# Patient Record
Sex: FEMALE | Race: OTHER | Employment: FULL TIME | URBAN - METROPOLITAN AREA
[De-identification: names, ages, dates, MRNs, and addresses within clinical notes are randomized per-mention and may not be internally consistent; named-entity substitution may affect disease eponyms.]

---

## 2019-02-19 ENCOUNTER — OFFICE VISIT (OUTPATIENT)
Dept: FAMILY MEDICINE CLINIC | Facility: CLINIC | Age: 49
End: 2019-02-19
Payer: COMMERCIAL

## 2019-02-19 VITALS
WEIGHT: 144 LBS | DIASTOLIC BLOOD PRESSURE: 68 MMHG | RESPIRATION RATE: 17 BRPM | HEART RATE: 69 BPM | HEIGHT: 64.5 IN | BODY MASS INDEX: 24.29 KG/M2 | SYSTOLIC BLOOD PRESSURE: 116 MMHG

## 2019-02-19 DIAGNOSIS — Z00.00 ROUTINE MEDICAL EXAM: Primary | ICD-10-CM

## 2019-02-19 DIAGNOSIS — H20.9 UVEITIS: ICD-10-CM

## 2019-02-19 DIAGNOSIS — O24.419 GESTATIONAL DIABETES MELLITUS (GDM), ANTEPARTUM, GESTATIONAL DIABETES METHOD OF CONTROL UNSPECIFIED: ICD-10-CM

## 2019-02-19 PROCEDURE — 90471 IMMUNIZATION ADMIN: CPT | Performed by: FAMILY MEDICINE

## 2019-02-19 PROCEDURE — 90715 TDAP VACCINE 7 YRS/> IM: CPT | Performed by: FAMILY MEDICINE

## 2019-02-19 PROCEDURE — 99386 PREV VISIT NEW AGE 40-64: CPT | Performed by: FAMILY MEDICINE

## 2019-02-19 NOTE — H&P
HPI:   Patient presents with:  Physical      Shyrl Beka is a 50year old female who presents for a complete physical exam without pap/gyne exam.     Patient has new complaints of:  · History of intermittent mild uveitis treated with topical steroid, pa Sexual Activity      Alcohol use: Yes        Frequency: 2-4 times a month        Drinks per session: 1 or 2        Binge frequency: Never      Drug use: No    Other Topics      Concerns:          REVIEW OF SYSTEMS:   Pertinent positives and negatives noted and LE, no tremor B UE, DTR's 2/4 B LE, gait WNL     ASSESSMENT AND PLAN:   1. Jacky Avitia is a 50year old female who presents for a complete physical exam.  She is in good health.   Her weight is stable, Estimated body mass index is 24.34 kg/m² as calcul pending results  - HEMOGLOBIN A1C; Future  - COMP METABOLIC PANEL (14); Future      The patient indicates understanding of the above recommendations and agrees to the above plan.   Follow up: as above    Orders Placed This Encounter      HgbA1c (Glycohemogl

## 2019-02-20 ENCOUNTER — MED REC SCAN ONLY (OUTPATIENT)
Dept: FAMILY MEDICINE CLINIC | Facility: CLINIC | Age: 49
End: 2019-02-20

## 2019-02-28 ENCOUNTER — LAB ENCOUNTER (OUTPATIENT)
Dept: LAB | Facility: REFERENCE LAB | Age: 49
End: 2019-02-28
Attending: FAMILY MEDICINE
Payer: COMMERCIAL

## 2019-02-28 DIAGNOSIS — O24.419 GESTATIONAL DIABETES MELLITUS (GDM), ANTEPARTUM, GESTATIONAL DIABETES METHOD OF CONTROL UNSPECIFIED: ICD-10-CM

## 2019-02-28 DIAGNOSIS — H20.9 UVEITIS: ICD-10-CM

## 2019-02-28 DIAGNOSIS — Z00.00 ROUTINE MEDICAL EXAM: ICD-10-CM

## 2019-02-28 LAB
ALBUMIN SERPL-MCNC: 3.5 G/DL (ref 3.4–5)
ALBUMIN/GLOB SERPL: 0.8 {RATIO} (ref 1–2)
ALP LIVER SERPL-CCNC: 49 U/L (ref 39–100)
ALT SERPL-CCNC: 25 U/L (ref 13–56)
ANION GAP SERPL CALC-SCNC: 5 MMOL/L (ref 0–18)
AST SERPL-CCNC: 22 U/L (ref 15–37)
BASOPHILS # BLD AUTO: 0.05 X10(3) UL (ref 0–0.2)
BASOPHILS NFR BLD AUTO: 0.8 %
BILIRUB SERPL-MCNC: 0.5 MG/DL (ref 0.1–2)
BUN BLD-MCNC: 12 MG/DL (ref 7–18)
BUN/CREAT SERPL: 16.2 (ref 10–20)
CALCIUM BLD-MCNC: 9 MG/DL (ref 8.5–10.1)
CHLORIDE SERPL-SCNC: 106 MMOL/L (ref 98–107)
CHOLEST SMN-MCNC: 125 MG/DL (ref ?–200)
CO2 SERPL-SCNC: 28 MMOL/L (ref 21–32)
CREAT BLD-MCNC: 0.74 MG/DL (ref 0.55–1.02)
DEPRECATED RDW RBC AUTO: 47.2 FL (ref 35.1–46.3)
EOSINOPHIL # BLD AUTO: 0.19 X10(3) UL (ref 0–0.7)
EOSINOPHIL NFR BLD AUTO: 3.1 %
ERYTHROCYTE [DISTWIDTH] IN BLOOD BY AUTOMATED COUNT: 14.9 % (ref 11–15)
EST. AVERAGE GLUCOSE BLD GHB EST-MCNC: 123 MG/DL (ref 68–126)
GLOBULIN PLAS-MCNC: 4.3 G/DL (ref 2.8–4.4)
GLUCOSE BLD-MCNC: 88 MG/DL (ref 70–99)
HBA1C MFR BLD HPLC: 5.9 % (ref ?–5.7)
HCT VFR BLD AUTO: 34.8 % (ref 35–48)
HDLC SERPL-MCNC: 56 MG/DL (ref 40–59)
HGB BLD-MCNC: 11.3 G/DL (ref 12–16)
IMM GRANULOCYTES # BLD AUTO: 0.02 X10(3) UL (ref 0–1)
IMM GRANULOCYTES NFR BLD: 0.3 %
LDLC SERPL CALC-MCNC: 57 MG/DL (ref ?–100)
LYMPHOCYTES # BLD AUTO: 2.11 X10(3) UL (ref 1–4)
LYMPHOCYTES NFR BLD AUTO: 34.6 %
M PROTEIN MFR SERPL ELPH: 7.8 G/DL (ref 6.4–8.2)
MCH RBC QN AUTO: 28 PG (ref 26–34)
MCHC RBC AUTO-ENTMCNC: 32.5 G/DL (ref 31–37)
MCV RBC AUTO: 86.4 FL (ref 80–100)
MONOCYTES # BLD AUTO: 0.3 X10(3) UL (ref 0.1–1)
MONOCYTES NFR BLD AUTO: 4.9 %
NEUTROPHILS # BLD AUTO: 3.43 X10 (3) UL (ref 1.5–7.7)
NEUTROPHILS # BLD AUTO: 3.43 X10(3) UL (ref 1.5–7.7)
NEUTROPHILS NFR BLD AUTO: 56.3 %
NONHDLC SERPL-MCNC: 69 MG/DL (ref ?–130)
OSMOLALITY SERPL CALC.SUM OF ELEC: 287 MOSM/KG (ref 275–295)
PLATELET # BLD AUTO: 297 10(3)UL (ref 150–450)
POTASSIUM SERPL-SCNC: 4.4 MMOL/L (ref 3.5–5.1)
RBC # BLD AUTO: 4.03 X10(6)UL (ref 3.8–5.3)
SODIUM SERPL-SCNC: 139 MMOL/L (ref 136–145)
TRIGL SERPL-MCNC: 60 MG/DL (ref 30–149)
TSI SER-ACNC: 3.21 MIU/ML (ref 0.36–3.74)
VLDLC SERPL CALC-MCNC: 12 MG/DL (ref 0–30)
WBC # BLD AUTO: 6.1 X10(3) UL (ref 4–11)

## 2019-02-28 PROCEDURE — 86735 MUMPS ANTIBODY: CPT | Performed by: FAMILY MEDICINE

## 2019-02-28 PROCEDURE — 86038 ANTINUCLEAR ANTIBODIES: CPT | Performed by: FAMILY MEDICINE

## 2019-02-28 PROCEDURE — 83036 HEMOGLOBIN GLYCOSYLATED A1C: CPT | Performed by: FAMILY MEDICINE

## 2019-02-28 PROCEDURE — 86762 RUBELLA ANTIBODY: CPT | Performed by: FAMILY MEDICINE

## 2019-02-28 PROCEDURE — 36415 COLL VENOUS BLD VENIPUNCTURE: CPT | Performed by: FAMILY MEDICINE

## 2019-02-28 PROCEDURE — 86658 ENTEROVIRUS ANTIBODY: CPT | Performed by: FAMILY MEDICINE

## 2019-02-28 PROCEDURE — 86765 RUBEOLA ANTIBODY: CPT | Performed by: FAMILY MEDICINE

## 2019-02-28 PROCEDURE — 80061 LIPID PANEL: CPT | Performed by: FAMILY MEDICINE

## 2019-02-28 PROCEDURE — 80050 GENERAL HEALTH PANEL: CPT | Performed by: FAMILY MEDICINE

## 2019-03-01 LAB
MEV IGG SER-ACNC: 10.8 AU/ML (ref 30–?)
MUV IGG SER IA-ACNC: 85.4 AU/ML (ref 11–?)
RUBV IGG SER QL: POSITIVE
RUBV IGG SER-ACNC: 46.2 IU/ML (ref 10–?)

## 2019-03-04 LAB — NUCLEAR IGG TITR SER IF: NEGATIVE {TITER}

## 2019-03-10 DIAGNOSIS — R73.09 ELEVATED HEMOGLOBIN A1C: Primary | ICD-10-CM

## 2019-03-10 DIAGNOSIS — D64.89 ANEMIA DUE TO OTHER CAUSE, NOT CLASSIFIED: ICD-10-CM

## 2019-04-10 ENCOUNTER — NURSE ONLY (OUTPATIENT)
Dept: FAMILY MEDICINE CLINIC | Facility: CLINIC | Age: 49
End: 2019-04-10
Payer: COMMERCIAL

## 2019-04-10 DIAGNOSIS — Z23 NEED FOR MMR VACCINE: Primary | ICD-10-CM

## 2019-04-10 PROCEDURE — 90707 MMR VACCINE SC: CPT | Performed by: FAMILY MEDICINE

## 2019-04-10 PROCEDURE — 90471 IMMUNIZATION ADMIN: CPT | Performed by: FAMILY MEDICINE

## 2019-05-21 PROBLEM — G57.63 MORTON METATARSALGIA, BILATERAL: Status: ACTIVE | Noted: 2019-05-21

## 2021-03-24 ENCOUNTER — TELEPHONE (OUTPATIENT)
Dept: FAMILY MEDICINE CLINIC | Facility: CLINIC | Age: 51
End: 2021-03-24

## 2021-07-06 ENCOUNTER — TELEPHONE (OUTPATIENT)
Dept: FAMILY MEDICINE CLINIC | Facility: CLINIC | Age: 51
End: 2021-07-06

## 2021-07-16 ENCOUNTER — TELEPHONE (OUTPATIENT)
Dept: FAMILY MEDICINE CLINIC | Facility: CLINIC | Age: 51
End: 2021-07-16

## 2021-07-16 NOTE — TELEPHONE ENCOUNTER
Received vm from pt, said employer requires titers for varicella, MMR, hep B, TB, and tdap.  Said she is visiting a customer (sounds like client is a hospital) and needs titers before able to go into hospital. Pt is requesting that orders be placed today so

## 2021-07-28 ENCOUNTER — OFFICE VISIT (OUTPATIENT)
Dept: FAMILY MEDICINE CLINIC | Facility: CLINIC | Age: 51
End: 2021-07-28
Payer: COMMERCIAL

## 2021-07-28 VITALS
HEART RATE: 73 BPM | BODY MASS INDEX: 24.93 KG/M2 | SYSTOLIC BLOOD PRESSURE: 108 MMHG | WEIGHT: 147.81 LBS | OXYGEN SATURATION: 97 % | HEIGHT: 64.5 IN | DIASTOLIC BLOOD PRESSURE: 52 MMHG

## 2021-07-28 DIAGNOSIS — Z01.84 IMMUNITY STATUS TESTING: ICD-10-CM

## 2021-07-28 DIAGNOSIS — Z00.00 ADULT GENERAL MEDICAL EXAMINATION: Primary | ICD-10-CM

## 2021-07-28 DIAGNOSIS — Z12.11 SCREENING FOR MALIGNANT NEOPLASM OF COLON: ICD-10-CM

## 2021-07-28 PROBLEM — G57.63 MORTON METATARSALGIA, BILATERAL: Status: RESOLVED | Noted: 2019-05-21 | Resolved: 2021-07-28

## 2021-07-28 PROCEDURE — 3008F BODY MASS INDEX DOCD: CPT | Performed by: NURSE PRACTITIONER

## 2021-07-28 PROCEDURE — 3074F SYST BP LT 130 MM HG: CPT | Performed by: NURSE PRACTITIONER

## 2021-07-28 PROCEDURE — 99396 PREV VISIT EST AGE 40-64: CPT | Performed by: NURSE PRACTITIONER

## 2021-07-28 PROCEDURE — 3078F DIAST BP <80 MM HG: CPT | Performed by: NURSE PRACTITIONER

## 2021-07-28 NOTE — PROGRESS NOTES
Chief Complaint:   Patient presents with:  Physical: w BE   Lab: titers for varicella,mmr, hep B , TB      HPI:   This is a 48year old female coming in for complete physical exam without pap/gyne. Patient feels well overall.  Walks 30 minutes/day for exerc Mumps IgG Immuity 85.4 >=11.0 AU/mL   RUBELLA, IGG   Result Value Ref Range    Rubella IgG Quantitative [id number] >=10 IU/mL    Rubella IgG Positive Positive   POLIOVIRUS (TYPES 1, 3) ANTIBODIES   Result Value Ref Range    Polio Virus Antibody Type 1 1:80 (SEE COMMENTS)    Comment:Pt is unsure of reaction. Current Meds:  No current outpatient medications on file. Counseling given: Not Answered       REVIEW OF SYSTEMS:   CONSTITUTIONAL:  Denies unusual weight gain/loss, fever, chills, or fatigue.   WILLIE lymphadenopathy BL, no nipple discharge or retraction, no skin changes BL. ABDOMEN:  Soft, nondistended, nontender, bowel sounds normal in all 4 quadrants, no masses, no hepatosplenomegaly. BACK: No tenderness to palpation, FROM.   EXTREMITIES:  No edema,

## 2021-07-28 NOTE — PATIENT INSTRUCTIONS

## 2021-07-31 LAB
ABSOLUTE BASOPHILS: 58 CELLS/UL (ref 0–200)
ABSOLUTE EOSINOPHILS: 122 CELLS/UL (ref 15–500)
ABSOLUTE LYMPHOCYTES: 2786 CELLS/UL (ref 850–3900)
ABSOLUTE MONOCYTES: 338 CELLS/UL (ref 200–950)
ABSOLUTE NEUTROPHILS: 3895 CELLS/UL (ref 1500–7800)
ALBUMIN/GLOBULIN RATIO: 1.3 (CALC) (ref 1–2.5)
ALBUMIN: 4 G/DL (ref 3.6–5.1)
ALKALINE PHOSPHATASE: 44 U/L (ref 37–153)
ALT: 14 U/L (ref 6–29)
AST: 17 U/L (ref 10–35)
BASOPHILS: 0.8 %
BILIRUBIN, TOTAL: 0.5 MG/DL (ref 0.2–1.2)
BUN: 15 MG/DL (ref 7–25)
CALCIUM: 8.9 MG/DL (ref 8.6–10.4)
CARBON DIOXIDE: 28 MMOL/L (ref 20–32)
CHLORIDE: 105 MMOL/L (ref 98–110)
CHOL/HDLC RATIO: 2.2 (CALC)
CHOLESTEROL, TOTAL: 127 MG/DL
CREATININE: 0.71 MG/DL (ref 0.5–1.05)
EGFR IF AFRICN AM: 115 ML/MIN/1.73M2
EGFR IF NONAFRICN AM: 99 ML/MIN/1.73M2
EOSINOPHILS: 1.7 %
GLOBULIN: 3.1 G/DL (CALC) (ref 1.9–3.7)
GLUCOSE: 97 MG/DL (ref 65–99)
HDL CHOLESTEROL: 57 MG/DL
HEMATOCRIT: 36.2 % (ref 35–45)
HEMOGLOBIN: 11.6 G/DL (ref 11.7–15.5)
LDL-CHOLESTEROL: 56 MG/DL (CALC)
LYMPHOCYTES: 38.7 %
MCH: 28.2 PG (ref 27–33)
MCHC: 32 G/DL (ref 32–36)
MCV: 88.1 FL (ref 80–100)
MEASLES ANTIBODY (IGG): <13.5 AU/ML
MITOGEN-NIL: 7.43 IU/ML
MONOCYTES: 4.7 %
MPV: 10.5 FL (ref 7.5–12.5)
MUMPS VIRUS$ANTIBODY (IGG): 104 AU/ML
NEUTROPHILS: 54.1 %
NIL: 0.04 IU/ML
NON-HDL CHOLESTEROL: 70 MG/DL (CALC)
PLATELET COUNT: 268 THOUSAND/UL (ref 140–400)
POTASSIUM: 5.1 MMOL/L (ref 3.5–5.3)
PROTEIN, TOTAL: 7.1 G/DL (ref 6.1–8.1)
QUANTIFERON(R)-TB GOLD PLUS, 1 TUBE: NEGATIVE
RDW: 13.2 % (ref 11–15)
RED BLOOD CELL COUNT: 4.11 MILLION/UL (ref 3.8–5.1)
RUBELLA ANTIBODY (IGG): 4.2 INDEX
SODIUM: 138 MMOL/L (ref 135–146)
TB1-NIL: 0 IU/ML
TB2-NIL: 0.01 IU/ML
TRIGLYCERIDES: 60 MG/DL
TSH: 4.15 MIU/L
VARICELLA ZOSTER VIRUS$AB (IGG): 895.6 INDEX
WHITE BLOOD CELL COUNT: 7.2 THOUSAND/UL (ref 3.8–10.8)

## 2021-09-01 ENCOUNTER — NURSE ONLY (OUTPATIENT)
Dept: FAMILY MEDICINE CLINIC | Facility: CLINIC | Age: 51
End: 2021-09-01
Payer: COMMERCIAL

## 2021-09-01 PROCEDURE — 90472 IMMUNIZATION ADMIN EACH ADD: CPT | Performed by: NURSE PRACTITIONER

## 2021-09-01 PROCEDURE — 90471 IMMUNIZATION ADMIN: CPT | Performed by: NURSE PRACTITIONER

## 2021-09-01 PROCEDURE — 90707 MMR VACCINE SC: CPT | Performed by: NURSE PRACTITIONER

## 2021-09-01 PROCEDURE — 90746 HEPB VACCINE 3 DOSE ADULT IM: CPT | Performed by: NURSE PRACTITIONER

## 2021-09-15 ENCOUNTER — NURSE ONLY (OUTPATIENT)
Dept: FAMILY MEDICINE CLINIC | Facility: CLINIC | Age: 51
End: 2021-09-15
Payer: COMMERCIAL

## 2021-09-15 DIAGNOSIS — Z23 ENCOUNTER FOR IMMUNIZATION: Primary | ICD-10-CM

## 2021-09-15 PROCEDURE — 90471 IMMUNIZATION ADMIN: CPT | Performed by: NURSE PRACTITIONER

## 2021-09-15 PROCEDURE — 90686 IIV4 VACC NO PRSV 0.5 ML IM: CPT | Performed by: NURSE PRACTITIONER

## 2021-10-06 ENCOUNTER — NURSE ONLY (OUTPATIENT)
Dept: FAMILY MEDICINE CLINIC | Facility: CLINIC | Age: 51
End: 2021-10-06
Payer: COMMERCIAL

## 2021-10-06 PROCEDURE — 90746 HEPB VACCINE 3 DOSE ADULT IM: CPT | Performed by: FAMILY MEDICINE

## 2021-10-06 PROCEDURE — 90471 IMMUNIZATION ADMIN: CPT | Performed by: FAMILY MEDICINE

## 2021-10-21 ENCOUNTER — TELEPHONE (OUTPATIENT)
Dept: FAMILY MEDICINE CLINIC | Facility: CLINIC | Age: 51
End: 2021-10-21

## 2021-11-03 NOTE — H&P
[facility] - Gastroenterology                                                                                                               Reason for consult: crc screening    Requesting physician or provider: [facility] Never used    Alcohol use: Yes    Drug use: No       Medications (Active prior to today's visit):  No current outpatient medications on file.        Allergies:    Penicillins             HIVES  Iodine (Topical)        OTHER (SEE COMMENTS)    Comment:Pt is u normal.  Last cbc 2021-hgb stable from comparison. Chronic normocytic anemia. H/o endometriosis. She denies a FH GI malignancy. He has not had a colonoscopy and so is now due for CRC screening. Plan for procedure at this time.    -miralax  1.  Schedule c exist.

## 2021-11-08 ENCOUNTER — TELEPHONE (OUTPATIENT)
Dept: GASTROENTEROLOGY | Facility: CLINIC | Age: 51
End: 2021-11-08

## 2021-11-08 ENCOUNTER — OFFICE VISIT (OUTPATIENT)
Dept: GASTROENTEROLOGY | Facility: CLINIC | Age: 51
End: 2021-11-08
Payer: COMMERCIAL

## 2021-11-08 VITALS
DIASTOLIC BLOOD PRESSURE: 81 MMHG | WEIGHT: 151 LBS | TEMPERATURE: 99 F | SYSTOLIC BLOOD PRESSURE: 117 MMHG | HEIGHT: 64.5 IN | HEART RATE: 60 BPM | BODY MASS INDEX: 25.46 KG/M2

## 2021-11-08 DIAGNOSIS — Z12.11 COLON CANCER SCREENING: Primary | ICD-10-CM

## 2021-11-08 DIAGNOSIS — K59.00 CONSTIPATION, UNSPECIFIED CONSTIPATION TYPE: ICD-10-CM

## 2021-11-08 PROCEDURE — 3008F BODY MASS INDEX DOCD: CPT | Performed by: NURSE PRACTITIONER

## 2021-11-08 PROCEDURE — 3079F DIAST BP 80-89 MM HG: CPT | Performed by: NURSE PRACTITIONER

## 2021-11-08 PROCEDURE — S0285 CNSLT BEFORE SCREEN COLONOSC: HCPCS | Performed by: NURSE PRACTITIONER

## 2021-11-08 PROCEDURE — 3074F SYST BP LT 130 MM HG: CPT | Performed by: NURSE PRACTITIONER

## 2021-11-08 RX ORDER — POLYETHYLENE GLYCOL 3350, SODIUM CHLORIDE, SODIUM BICARBONATE, POTASSIUM CHLORIDE 420; 11.2; 5.72; 1.48 G/4L; G/4L; G/4L; G/4L
POWDER, FOR SOLUTION ORAL
Qty: 4000 ML | Refills: 0 | Status: SHIPPED | OUTPATIENT
Start: 2021-11-08

## 2021-11-08 NOTE — PATIENT INSTRUCTIONS
-miralax  1. Schedule colonoscopy with MAC or IV w/ Dr. Summer Barker [Diagnosis: crc screening]    2.  bowel prep from pharmacy (split trilyte)    3. Continue all medications for procedure    4. Read all bowel prep instructions carefully    5.  AVOID seed

## 2021-11-08 NOTE — TELEPHONE ENCOUNTER
Scheduled for:  Colonoscopy 10710  Provider Name:  Dr Mary Ellen Storey  Date:  1/3/2022  Location:  [facility]  Sedation:  MAC  Time:  9:30am (pt is aware that [facility] will call the day before to confirm arrival time)   Prep:  Trilyte  Meds/Allergies Reconciled?:  Carol/MER

## 2021-12-03 ENCOUNTER — MED REC SCAN ONLY (OUTPATIENT)
Dept: FAMILY MEDICINE CLINIC | Facility: CLINIC | Age: 51
End: 2021-12-03

## 2021-12-31 ENCOUNTER — LAB REQUISITION (OUTPATIENT)
Dept: SURGERY | Age: 51
End: 2021-12-31
Payer: COMMERCIAL

## 2021-12-31 DIAGNOSIS — Z01.818 PREOP EXAMINATION: ICD-10-CM

## 2022-01-01 LAB — SARS-COV-2 RNA RESP QL NAA+PROBE: NOT DETECTED

## 2022-01-03 ENCOUNTER — SURGERY CENTER DOCUMENTATION (OUTPATIENT)
Dept: SURGERY | Age: 52
End: 2022-01-03

## 2022-01-03 DIAGNOSIS — K64.8 INTERNAL HEMORRHOIDS: ICD-10-CM

## 2022-01-03 PROCEDURE — 45378 DIAGNOSTIC COLONOSCOPY: CPT | Performed by: INTERNAL MEDICINE

## 2022-01-03 NOTE — PROCEDURES
COLONOSCOPY REPORT    Parvin Cordelia     1970 Age 46year old   PCP Kailash Harkins MD Endoscopist Franklin Roper MD     Date of procedure: 22    Location: [facility]  [facility])    Procedure: Colonoscopy angioectasias or inflammation. 6. TALYA: normal rectal tone, no masses palpated. Impression:   · Normal colonoscopy to the terminal ileum, other than small internal hemorrhoids. Recommend:  · Repeat CLN in 10 years.  If new signs or symptoms devel

## 2022-01-11 ENCOUNTER — TELEPHONE (OUTPATIENT)
Dept: GASTROENTEROLOGY | Facility: CLINIC | Age: 52
End: 2022-01-11

## 2022-01-11 NOTE — TELEPHONE ENCOUNTER
Entered into Epic. Recall CLN in 10 years per Dr. Joan Middleton. Last CLN done 01/03/2022. Recall entered into Patient Outreach for 01/03/2032. Health Maintenance updated.

## 2022-10-25 ENCOUNTER — NURSE ONLY (OUTPATIENT)
Dept: FAMILY MEDICINE CLINIC | Facility: CLINIC | Age: 52
End: 2022-10-25
Payer: COMMERCIAL

## 2022-10-25 PROCEDURE — 90471 IMMUNIZATION ADMIN: CPT | Performed by: FAMILY MEDICINE

## 2022-10-25 PROCEDURE — 90686 IIV4 VACC NO PRSV 0.5 ML IM: CPT | Performed by: FAMILY MEDICINE

## 2023-02-12 ENCOUNTER — APPOINTMENT (OUTPATIENT)
Dept: GENERAL RADIOLOGY | Age: 53
End: 2023-02-12
Attending: PHYSICIAN ASSISTANT
Payer: COMMERCIAL

## 2023-02-12 ENCOUNTER — HOSPITAL ENCOUNTER (OUTPATIENT)
Age: 53
Discharge: HOME | End: 2023-02-12
Payer: COMMERCIAL

## 2023-02-12 VITALS
DIASTOLIC BLOOD PRESSURE: 79 MMHG | OXYGEN SATURATION: 100 % | SYSTOLIC BLOOD PRESSURE: 117 MMHG | TEMPERATURE: 97 F | RESPIRATION RATE: 20 BRPM | HEART RATE: 73 BPM

## 2023-02-12 DIAGNOSIS — R05.1 ACUTE COUGH: ICD-10-CM

## 2023-02-12 DIAGNOSIS — J01.90 ACUTE NON-RECURRENT SINUSITIS, UNSPECIFIED LOCATION: Primary | ICD-10-CM

## 2023-02-12 PROCEDURE — 71046 X-RAY EXAM CHEST 2 VIEWS: CPT | Performed by: PHYSICIAN ASSISTANT

## 2023-02-12 PROCEDURE — 99213 OFFICE O/P EST LOW 20 MIN: CPT | Performed by: PHYSICIAN ASSISTANT

## 2023-02-12 RX ORDER — CEFDINIR 300 MG/1
300 CAPSULE ORAL 2 TIMES DAILY
Qty: 14 CAPSULE | Refills: 0 | Status: SHIPPED | OUTPATIENT
Start: 2023-02-12 | End: 2023-02-19

## 2023-02-12 RX ORDER — CODEINE PHOSPHATE AND GUAIFENESIN 10; 100 MG/5ML; MG/5ML
5 SOLUTION ORAL EVERY 6 HOURS PRN
Qty: 50 ML | Refills: 0 | Status: SHIPPED | OUTPATIENT
Start: 2023-02-12

## 2023-02-12 RX ORDER — ALBUTEROL SULFATE 90 UG/1
2 AEROSOL, METERED RESPIRATORY (INHALATION) EVERY 4 HOURS PRN
Qty: 1 EACH | Refills: 0 | Status: SHIPPED | OUTPATIENT
Start: 2023-02-12 | End: 2023-03-14

## 2023-02-12 RX ORDER — IBUPROFEN 600 MG/1
TABLET ORAL
Qty: 20 TABLET | Refills: 0 | Status: SHIPPED | OUTPATIENT
Start: 2023-02-12

## 2023-02-12 RX ORDER — BENZONATATE 100 MG/1
100 CAPSULE ORAL 3 TIMES DAILY PRN
Qty: 30 CAPSULE | Refills: 0 | Status: SHIPPED | OUTPATIENT
Start: 2023-02-12 | End: 2023-03-14

## 2023-02-12 NOTE — ED INITIAL ASSESSMENT (HPI)
Patient is here with what started with a sore throat Wednesday and then she developed head congestion and on Friday she developed a cough.

## 2023-10-31 ENCOUNTER — PATIENT MESSAGE (OUTPATIENT)
Dept: ADMINISTRATIVE | Age: 53
End: 2023-10-31

## 2023-10-31 ENCOUNTER — TELEPHONE (OUTPATIENT)
Dept: FAMILY MEDICINE CLINIC | Facility: CLINIC | Age: 53
End: 2023-10-31

## 2023-10-31 DIAGNOSIS — H20.9 IRITIS: Primary | ICD-10-CM

## 2023-10-31 NOTE — TELEPHONE ENCOUNTER
Per Dr. Dante Ulloa : \" We have worked with Grace Birmingham MD for which we can place a rheumatology referral. \"    Rheumatology referral entered.      LM on with referral for Rheumatologist Dr. Vibha Lacy per the pt's request.

## 2023-10-31 NOTE — TELEPHONE ENCOUNTER
Pt requesting recommendation for Rheumatologist. Pt has iritis and due to multiple exacerbations her Ophthalmologist has recommended she go for Rheumatology consult. Pt does not need a referral. Does Dr. Trupti Graf have recommendation for a Rheumatologist?    Please advise.

## 2023-11-28 ENCOUNTER — HOSPITAL ENCOUNTER (OUTPATIENT)
Dept: GENERAL RADIOLOGY | Facility: HOSPITAL | Age: 53
Discharge: HOME | End: 2023-11-28
Attending: INTERNAL MEDICINE
Payer: COMMERCIAL

## 2023-11-28 ENCOUNTER — OFFICE VISIT (OUTPATIENT)
Dept: RHEUMATOLOGY | Facility: CLINIC | Age: 53
End: 2023-11-28
Payer: COMMERCIAL

## 2023-11-28 ENCOUNTER — LAB ENCOUNTER (OUTPATIENT)
Dept: LAB | Facility: HOSPITAL | Age: 53
End: 2023-11-28
Attending: INTERNAL MEDICINE
Payer: COMMERCIAL

## 2023-11-28 VITALS
SYSTOLIC BLOOD PRESSURE: 111 MMHG | BODY MASS INDEX: 24.79 KG/M2 | WEIGHT: 147 LBS | HEART RATE: 72 BPM | DIASTOLIC BLOOD PRESSURE: 73 MMHG | HEIGHT: 64.5 IN

## 2023-11-28 DIAGNOSIS — H20.9 UVEITIS: ICD-10-CM

## 2023-11-28 DIAGNOSIS — H20.9 UVEITIS: Primary | ICD-10-CM

## 2023-11-28 LAB
ALBUMIN SERPL-MCNC: 4.4 G/DL (ref 3.2–4.8)
ALBUMIN/GLOB SERPL: 1.4 {RATIO} (ref 1–2)
ALP LIVER SERPL-CCNC: 57 U/L
ALT SERPL-CCNC: 20 U/L
ANION GAP SERPL CALC-SCNC: 6 MMOL/L (ref 0–18)
AST SERPL-CCNC: 24 U/L (ref ?–34)
BASOPHILS # BLD AUTO: 0.02 X10(3) UL (ref 0–0.2)
BASOPHILS NFR BLD AUTO: 0.3 %
BILIRUB SERPL-MCNC: 0.4 MG/DL (ref 0.3–1.2)
BUN BLD-MCNC: 14 MG/DL (ref 9–23)
BUN/CREAT SERPL: 16.1 (ref 10–20)
C3 SERPL-MCNC: 137.7 MG/DL (ref 90–170)
C4 SERPL-MCNC: 39.7 MG/DL (ref 12–36)
CALCIUM BLD-MCNC: 9.6 MG/DL (ref 8.7–10.4)
CHLORIDE SERPL-SCNC: 105 MMOL/L (ref 98–112)
CO2 SERPL-SCNC: 30 MMOL/L (ref 21–32)
CREAT BLD-MCNC: 0.87 MG/DL
CRP SERPL-MCNC: 0.7 MG/DL (ref ?–1)
DEPRECATED RDW RBC AUTO: 45.8 FL (ref 35.1–46.3)
EGFRCR SERPLBLD CKD-EPI 2021: 80 ML/MIN/1.73M2 (ref 60–?)
EOSINOPHIL # BLD AUTO: 0.13 X10(3) UL (ref 0–0.7)
EOSINOPHIL NFR BLD AUTO: 1.9 %
ERYTHROCYTE [DISTWIDTH] IN BLOOD BY AUTOMATED COUNT: 14.1 % (ref 11–15)
ERYTHROCYTE [SEDIMENTATION RATE] IN BLOOD: 27 MM/HR
FASTING STATUS PATIENT QL REPORTED: NO
GLOBULIN PLAS-MCNC: 3.2 G/DL (ref 2.8–4.4)
GLUCOSE BLD-MCNC: 101 MG/DL (ref 70–99)
HAV IGM SER QL: NONREACTIVE
HBV CORE IGM SER QL: NONREACTIVE
HBV SURFACE AG SERPL QL IA: NONREACTIVE
HCT VFR BLD AUTO: 37.3 %
HCV AB SERPL QL IA: NONREACTIVE
HGB BLD-MCNC: 11.9 G/DL
IMM GRANULOCYTES # BLD AUTO: 0.03 X10(3) UL (ref 0–1)
IMM GRANULOCYTES NFR BLD: 0.4 %
LYMPHOCYTES # BLD AUTO: 2.75 X10(3) UL (ref 1–4)
LYMPHOCYTES NFR BLD AUTO: 39.3 %
MCH RBC QN AUTO: 28.4 PG (ref 26–34)
MCHC RBC AUTO-ENTMCNC: 31.9 G/DL (ref 31–37)
MCV RBC AUTO: 89 FL
MONOCYTES # BLD AUTO: 0.35 X10(3) UL (ref 0.1–1)
MONOCYTES NFR BLD AUTO: 5 %
NEUTROPHILS # BLD AUTO: 3.72 X10 (3) UL (ref 1.5–7.7)
NEUTROPHILS # BLD AUTO: 3.72 X10(3) UL (ref 1.5–7.7)
NEUTROPHILS NFR BLD AUTO: 53.1 %
OSMOLALITY SERPL CALC.SUM OF ELEC: 293 MOSM/KG (ref 275–295)
PLATELET # BLD AUTO: 249 10(3)UL (ref 150–450)
POTASSIUM SERPL-SCNC: 3.4 MMOL/L (ref 3.5–5.1)
PROT SERPL-MCNC: 7.6 G/DL (ref 5.7–8.2)
RBC # BLD AUTO: 4.19 X10(6)UL
RHEUMATOID FACT SERPL-ACNC: 29 IU/ML (ref ?–14)
SODIUM SERPL-SCNC: 141 MMOL/L (ref 136–145)
T4 FREE SERPL-MCNC: 1.1 NG/DL (ref 0.8–1.7)
TSI SER-ACNC: 3.3 MIU/ML (ref 0.55–4.78)
WBC # BLD AUTO: 7 X10(3) UL (ref 4–11)

## 2023-11-28 PROCEDURE — 80053 COMPREHEN METABOLIC PANEL: CPT | Performed by: INTERNAL MEDICINE

## 2023-11-28 PROCEDURE — 86225 DNA ANTIBODY NATIVE: CPT

## 2023-11-28 PROCEDURE — 99205 OFFICE O/P NEW HI 60 MIN: CPT | Performed by: INTERNAL MEDICINE

## 2023-11-28 PROCEDURE — 72202 X-RAY EXAM SI JOINTS 3/> VWS: CPT | Performed by: INTERNAL MEDICINE

## 2023-11-28 PROCEDURE — 86140 C-REACTIVE PROTEIN: CPT | Performed by: INTERNAL MEDICINE

## 2023-11-28 PROCEDURE — 86480 TB TEST CELL IMMUN MEASURE: CPT | Performed by: INTERNAL MEDICINE

## 2023-11-28 PROCEDURE — 85652 RBC SED RATE AUTOMATED: CPT | Performed by: INTERNAL MEDICINE

## 2023-11-28 PROCEDURE — 3008F BODY MASS INDEX DOCD: CPT | Performed by: INTERNAL MEDICINE

## 2023-11-28 PROCEDURE — 86037 ANCA TITER EACH ANTIBODY: CPT

## 2023-11-28 PROCEDURE — 86160 COMPLEMENT ANTIGEN: CPT | Performed by: INTERNAL MEDICINE

## 2023-11-28 PROCEDURE — 72110 X-RAY EXAM L-2 SPINE 4/>VWS: CPT | Performed by: INTERNAL MEDICINE

## 2023-11-28 PROCEDURE — 86038 ANTINUCLEAR ANTIBODIES: CPT | Performed by: INTERNAL MEDICINE

## 2023-11-28 PROCEDURE — 85549 MURAMIDASE: CPT | Performed by: INTERNAL MEDICINE

## 2023-11-28 PROCEDURE — 3074F SYST BP LT 130 MM HG: CPT | Performed by: INTERNAL MEDICINE

## 2023-11-28 PROCEDURE — 83516 IMMUNOASSAY NONANTIBODY: CPT

## 2023-11-28 PROCEDURE — 86200 CCP ANTIBODY: CPT | Performed by: INTERNAL MEDICINE

## 2023-11-28 PROCEDURE — 84443 ASSAY THYROID STIM HORMONE: CPT | Performed by: INTERNAL MEDICINE

## 2023-11-28 PROCEDURE — 84439 ASSAY OF FREE THYROXINE: CPT | Performed by: INTERNAL MEDICINE

## 2023-11-28 PROCEDURE — 82164 ANGIOTENSIN I ENZYME TEST: CPT | Performed by: INTERNAL MEDICINE

## 2023-11-28 PROCEDURE — 86431 RHEUMATOID FACTOR QUANT: CPT | Performed by: INTERNAL MEDICINE

## 2023-11-28 PROCEDURE — 81374 HLA I TYPING 1 ANTIGEN LR: CPT | Performed by: INTERNAL MEDICINE

## 2023-11-28 PROCEDURE — 85025 COMPLETE CBC W/AUTO DIFF WBC: CPT | Performed by: INTERNAL MEDICINE

## 2023-11-28 PROCEDURE — 36415 COLL VENOUS BLD VENIPUNCTURE: CPT | Performed by: INTERNAL MEDICINE

## 2023-11-28 PROCEDURE — 80074 ACUTE HEPATITIS PANEL: CPT | Performed by: INTERNAL MEDICINE

## 2023-11-28 PROCEDURE — 3078F DIAST BP <80 MM HG: CPT | Performed by: INTERNAL MEDICINE

## 2023-11-28 NOTE — PROGRESS NOTES
[id number] [facility] is a 46year old female. ASSESSMENT/PLAN:       ICD-10-CM    1. Uveitis  H20.9 Angiotensin converting enzyme     Lysozyme, Serum     XR CHEST PA + LAT CHEST (CPT=71046)     ANCA Panel Vasculitis     Anti-dsDNA Antibody, IgG     Anti-Nuclear Antibody (MELY) by IFA, Reflex Titer + Specific Antibodies     CBC With Differential With Platelet     Comp Metabolic Panel (14)     Complement C3, Serum     Complement C4, Serum     C-Reactive Protein     Cyclic Citrullinate Pep. IGG     Hepatitis Panel, Acute (4)     Quantiferon TB Plus     Rheumatoid Arthritis Factor     Sed Rate, Westergren (Automated)     HLA B 27 Disease Association     XR LUMBAR SPINE (MIN 4 VIEWS) (CPT=72110)     XR SACROILIAC JOINTS (MIN 3 VIEWS) (CPT=72202)     TSH and Free T4            DISCUSSION:  Patient presents as a new outpatient referral for evaluation of recurrent bilateral uveitis, ongoing for about a decade. Patient presents as a new outpatient referral for evaluation of uveitis. In the differential of recurrent uveitis, would consider an underlying spondyloarthropathy, sarcoidosis, relapsing polychondritis, Sjogren's syndrome, Behcet's disease, SLE, vasculitis. At this time, patient does not have features of an underlying inflammatory arthritis nor connective tissue disease. However, she has a strong family history of uveitis as well as ankylosing spondylitis. Therefore, we will order further workup, as above. PLAN:  - Patient to obtain above blood work  - Will check lumbar spine XRs and SI joint XR to evaluate for erosive/inflammatory changes or ankylosing    I will MyChart patient on receipt of above lab results and for next recommendations. Taj Jackson DO  11/28/2023   2:47 PM    HPI:     Chief Complaint   Patient presents with    Consult     Eye flare ups       I had the pleasure of seeing Sigifredo Coles on 11/28/2023 as a new outpatient consultation for history of uveitis. The patient was originally referred by her ophthalmologist.    46year old female w/ PMH recurrent bilateral uveitis who presents to clinic w/ episodes of recurrent bilateral uveitis. She states that episodes are characterized by periorbital headaches, eye redness, photophobia, and eye pain especially with extraocular movements. She is had a decade of recurrent episodes with symptoms previously occurring once a year but this most recent year, she has had multiple episodes. Symptoms are improved by topical steroid eyedrops. No known triggers to her symptoms including no known preceding illness, no GI symptoms and no history of ulcerative colitis or Crohn's disease. She denies joint pain including no chronic back pain and no swollen joints. ROS also negative for fever, chills, rash including no psoriatic plaques, no respiratory symptoms, no swelling of her nose or ears, no neuropathies, no Raynaud's phenomenon, no recurrent epistaxis, no oral/nasal ulcers, no jaw claudication. She does admit to chronically dry eyes. Family history is notable for multiple family members also with uveitis: Sister with recurrent uveitis, brother with ankylosing spondylitis complicated by uveitis, and father with ankylosing spondylitis complicated by uveitis. HISTORY:  Past Medical History:   Diagnosis Date    Gestational diabetes     Del Cid metatarsalgia, bilateral 2019     product of IVF pregnancy     Screen for colon cancer     repeat CLN in 10 years    Uveitis of both eyes       Social Hx Reviewed   Family Hx Reviewed     Medications (Active prior to today's visit):  Current Outpatient Medications   Medication Sig Dispense Refill    Spacer/Aero-Holding Chambers Does not apply Device Use with albuterol inhaler 1 each 0    guaiFENesin-codeine (CHERATUSSIN AC) 100-10 MG/5ML Oral Solution Take 5 mL by mouth every 6 (six) hours as needed for cough.  50 mL 0    ibuprofen 600 MG Oral Tab Take 1 tablet (600 mg total) by mouth every 6 hours with food (Patient not taking: Reported on 11/28/2023) 20 tablet 0    PEG 3350-KCl-Na Bicarb-NaCl (TRILYTE) 420 g Oral Recon Soln Take prep as directed by gastro office. May substitute with Trilyte/generic equivalent if needed. (Patient not taking: Reported on 2/12/2023) 4000 mL 0       Allergies: Allergies   Allergen Reactions    Penicillins HIVES    Iodine (Topical) OTHER (SEE COMMENTS)     Pt is unsure of reaction. ROS:   Review of Systems   Constitutional:  Negative for chills and fever. HENT:  Negative for congestion, hearing loss, mouth sores, nosebleeds and trouble swallowing. Eyes:  Positive for photophobia (h/o uveitis), pain (h/o uveitis), redness (.h/o uveitis) and visual disturbance (h/o uveitis). Respiratory:  Negative for cough and shortness of breath. Cardiovascular:  Negative for chest pain, palpitations and leg swelling. Gastrointestinal:  Negative for abdominal pain, blood in stool, diarrhea and nausea. Endocrine: Negative for cold intolerance and heat intolerance. Genitourinary:  Negative for dysuria, frequency and hematuria. Musculoskeletal:  Negative for arthralgias, back pain, gait problem, joint swelling, myalgias, neck pain and neck stiffness. Skin:  Negative for color change and rash. Neurological:  Negative for dizziness, weakness, numbness and headaches. Psychiatric/Behavioral:  Negative for confusion and dysphoric mood. PHYSICAL EXAM:     Constitutional:  Well developed, Well nourished, No acute distress  HENT:  Normocephalic, Atraumatic, Bilateral external ears normal, Oropharynx moist, No oral exudates. Neck: Normal range of motion, No tenderness, Supple, No stridor. Eyes:  PERRL, EOMI, Conjunctiva normal, No discharge. Respiratory:  Normal breath sounds, No respiratory distress, No wheezing. Cardiovascular:  Normal heart rate, Normal rhythm, No murmurs, No rubs, No gallops.   GI:  Bowel sounds normal, Soft, No tenderness, No masses, No pulsatile masses. : No CVA tenderness. Musculoskeletal:  Good range of motion in all major joints including negative bilateral CARTER testing, normal bilateral logroll. A comprehensive 28 count joint exam was done and was negative for swelling or tenderness except as noted. Inspections for misalignment, asymmetry, crepitation, defects, tenderness, masses, nodules, effusions, range of motion, and stability in the upper and lower extremities bilaterally are all normal unless noted. Integument:  Warm, Dry, No erythema, No rash. Lymphatic:  No lymphadenopathy noted. Neurologic:  Alert & oriented x 3, Normal motor function, Normal sensory function, No focal deficits noted. Psychiatric:  Affect normal, Judgment normal, Mood normal.    LABS:   2021:  CBC with normocytic anemia Hg 11.6 but no other cytopenias, CMP with normal renal hepatic function    2019:  MELY negative  TSH 3.210 WNL    Imagin2023 x-ray independently reviewed and notable for: No ankylosing nor inflammatory changes noted along the thoracic spine. No perihilar lymphadenopathy.

## 2023-11-30 LAB
ACE: 55 U/L
M TB IFN-G CD4+ T-CELLS BLD-ACNC: 0 IU/ML
M TB TUBERC IFN-G BLD QL: NEGATIVE
M TB TUBERC IGNF/MITOGEN IGNF CONTROL: >10 IU/ML
NUCLEAR IGG TITR SER IF: NEGATIVE {TITER}
QFT TB1 AG MINUS NIL: 0 IU/ML
QFT TB2 AG MINUS NIL: 0.02 IU/ML

## 2023-12-01 LAB
ANTI-MPO ANTIBODIES: <0.2 UNITS
ANTI-PR3 ANTIBODIES: <0.2 UNITS
CCP IGG SERPL-ACNC: 1.1 U/ML (ref 0–6.9)
DSDNA IGG SERPL IA-ACNC: <0.6 IU/ML
LYSOZYME: 4.5 UG/ML

## 2023-12-04 LAB — HLA-B27: POSITIVE

## 2023-12-05 NOTE — TELEPHONE ENCOUNTER
Called patient 12/5 during the AM, however, patient unavailable so voicemail left. Patient originally presented as a new outpatient consultation for history of iritis and uveitis. 11/28 labs reviewed and notable for:+HLA B27, ACE and lysozyme levels normal, MELY negative, CBC with normocytic anemia Hgb 11.9 but no other cytopenias, CMP with normal renal and hepatic function, complements non-low, inflammatory markers not elevated, acute hepatitis panel nonreactive, TB testing negative,RF positive at 29, CCP 1.1 WNL, DsDNA <0.6, ANCA negative, thyroid testing normal 11/28 lumbar spine and SI joint x-rays notable for some SI joint degenerative arthritic changes but no inflammatory/erosive changes While the patient does not have other features of an active underlying inflammatory arthritis such as HLA-B27 spondyloarthropathy, given the recurrence and severity of her iritis/uveitis, it would be worth further discussion of more chronic therapy. Given current lack of an inflammatory arthritis component, would consider oral DMARDs first over TNF inhibitor therapy.

## 2023-12-17 ENCOUNTER — APPOINTMENT (OUTPATIENT)
Dept: GENERAL RADIOLOGY | Age: 53
End: 2023-12-17
Attending: NURSE PRACTITIONER
Payer: COMMERCIAL

## 2023-12-17 ENCOUNTER — HOSPITAL ENCOUNTER (OUTPATIENT)
Age: 53
Discharge: HOME | End: 2023-12-17
Payer: COMMERCIAL

## 2023-12-17 VITALS
DIASTOLIC BLOOD PRESSURE: 78 MMHG | RESPIRATION RATE: 16 BRPM | TEMPERATURE: 98 F | SYSTOLIC BLOOD PRESSURE: 124 MMHG | HEART RATE: 83 BPM | OXYGEN SATURATION: 100 %

## 2023-12-17 DIAGNOSIS — R05.1 ACUTE COUGH: Primary | ICD-10-CM

## 2023-12-17 DIAGNOSIS — B34.9 VIRAL SYNDROME: ICD-10-CM

## 2023-12-17 LAB
#MXD IC: 0.6 X10ˆ3/UL (ref 0.1–1)
ATRIAL RATE: 61 BPM
BUN BLD-MCNC: 10 MG/DL (ref 7–18)
CHLORIDE BLD-SCNC: 103 MMOL/L (ref 98–112)
CO2 BLD-SCNC: 27 MMOL/L (ref 21–32)
CREAT BLD-MCNC: 0.7 MG/DL
EGFRCR SERPLBLD CKD-EPI 2021: 104 ML/MIN/1.73M2 (ref 60–?)
GLUCOSE BLD-MCNC: 99 MG/DL (ref 70–99)
HCT VFR BLD AUTO: 38.7 %
HCT VFR BLD CALC: 41 %
HGB BLD-MCNC: 12.5 G/DL
ISTAT IONIZED CALCIUM FOR CHEM 8: 1.16 MMOL/L (ref 1.12–1.32)
LYMPHOCYTES # BLD AUTO: 2.2 X10ˆ3/UL (ref 1–4)
LYMPHOCYTES NFR BLD AUTO: 45.3 %
MCH RBC QN AUTO: 27.8 PG (ref 26–34)
MCHC RBC AUTO-ENTMCNC: 32.3 G/DL (ref 31–37)
MCV RBC AUTO: 86 FL (ref 80–100)
MIXED CELL %: 11.5 %
NEUTROPHILS # BLD AUTO: 2.1 X10ˆ3/UL (ref 1.5–7.7)
NEUTROPHILS NFR BLD AUTO: 43.2 %
P AXIS: 59 DEGREES
P-R INTERVAL: 154 MS
PLATELET # BLD AUTO: 242 X10ˆ3/UL (ref 150–450)
POCT INFLUENZA A: NEGATIVE
POCT INFLUENZA B: NEGATIVE
POTASSIUM BLD-SCNC: 4.2 MMOL/L (ref 3.6–5.1)
Q-T INTERVAL: 428 MS
QRS DURATION: 94 MS
QTC CALCULATION (BEZET): 430 MS
R AXIS: 71 DEGREES
RBC # BLD AUTO: 4.5 X10ˆ6/UL
SARS-COV-2 RNA RESP QL NAA+PROBE: NOT DETECTED
SODIUM BLD-SCNC: 140 MMOL/L (ref 136–145)
T AXIS: 27 DEGREES
TROPONIN I BLD-MCNC: <0.02 NG/ML
VENTRICULAR RATE: 61 BPM
WBC # BLD AUTO: 4.9 X10ˆ3/UL (ref 4–11)

## 2023-12-17 PROCEDURE — 87502 INFLUENZA DNA AMP PROBE: CPT | Performed by: NURSE PRACTITIONER

## 2023-12-17 PROCEDURE — U0002 COVID-19 LAB TEST NON-CDC: HCPCS | Performed by: NURSE PRACTITIONER

## 2023-12-17 PROCEDURE — 71046 X-RAY EXAM CHEST 2 VIEWS: CPT | Performed by: NURSE PRACTITIONER

## 2023-12-17 PROCEDURE — 85025 COMPLETE CBC W/AUTO DIFF WBC: CPT | Performed by: NURSE PRACTITIONER

## 2023-12-17 PROCEDURE — 93000 ELECTROCARDIOGRAM COMPLETE: CPT | Performed by: NURSE PRACTITIONER

## 2023-12-17 PROCEDURE — 99214 OFFICE O/P EST MOD 30 MIN: CPT | Performed by: NURSE PRACTITIONER

## 2023-12-17 PROCEDURE — 84484 ASSAY OF TROPONIN QUANT: CPT | Performed by: NURSE PRACTITIONER

## 2023-12-17 PROCEDURE — 80047 BASIC METABLC PNL IONIZED CA: CPT | Performed by: NURSE PRACTITIONER

## 2023-12-17 RX ORDER — BENZONATATE 100 MG/1
100 CAPSULE ORAL 3 TIMES DAILY PRN
Qty: 30 CAPSULE | Refills: 0 | Status: SHIPPED | OUTPATIENT
Start: 2023-12-17 | End: 2024-01-16

## 2023-12-17 RX ORDER — ALBUTEROL SULFATE 90 UG/1
2 AEROSOL, METERED RESPIRATORY (INHALATION) EVERY 6 HOURS PRN
Qty: 1 EACH | Refills: 0 | Status: SHIPPED | OUTPATIENT
Start: 2023-12-17 | End: 2024-01-16

## 2023-12-17 NOTE — ED INITIAL ASSESSMENT (HPI)
Pt c/o post nasal drip x6 days, congestion, cough, chest pain with coughing x4 days. No fever. States fainted 2 days ago-was in a hot room and has low blood pressure. States her  caught her. States feels may have a bump to L side of head.

## 2023-12-17 NOTE — DISCHARGE INSTRUCTIONS
Viral infections are usually the worst days 3-5, but can last up to 14 days. You may develop or continue to cough for up to 3 weeks after. Viral infections do not improve with the use of antibiotics. [place] diet, increase water intake; gatorade or pedialyte   Runny Nose/Congestion:  Humidifier at bedside   Vicks vaporub under nose and chest wall  - Nasal Rinse (Isabell Pot)  - Flonase  - Antihistamine (Allegra, Zyrtec, Claritin)  - Nasal Decongestant (Sudafed); if you have high blood pressure max use 2 days  Cough:  Tessalon perles three times a day  Albuterol inhaler 2 puffs every 6 hours for cough, bronchospasm.  - Warm tea w/honey  - Humidifier in bedroom at night  Sore Throat:  - Salt water gargle  - Ibuprofen every 6-8 hours as needed for pain  Fever:  Tylenol or Motrin every 6 hours for fever > 100.4. You can alternate between the two as well if fever high. Follow up with your primary care provider in the next 2-3 days. Go to the emergency room with:  - Fever > 102 that does not respond to medications. - Shortness of breath, difficulty breathing.  - Chest pain.   - Vomiting and unable to keep down fluids or medications

## 2024-01-19 ENCOUNTER — OFFICE VISIT (OUTPATIENT)
Dept: RHEUMATOLOGY | Facility: CLINIC | Age: 54
End: 2024-01-19
Payer: COMMERCIAL

## 2024-01-19 VITALS — WEIGHT: 155 LBS | BODY MASS INDEX: 26.14 KG/M2 | HEIGHT: 64.5 IN

## 2024-01-19 DIAGNOSIS — M06.4 INFLAMMATORY POLYARTHRITIS (HCC): ICD-10-CM

## 2024-01-19 DIAGNOSIS — M47.899 HLA-B27 SPONDYLOARTHROPATHY: Primary | ICD-10-CM

## 2024-01-19 DIAGNOSIS — H20.023 RECURRENT IRITIS OF BOTH EYES: ICD-10-CM

## 2024-01-19 PROCEDURE — 3008F BODY MASS INDEX DOCD: CPT | Performed by: INTERNAL MEDICINE

## 2024-01-19 PROCEDURE — 99215 OFFICE O/P EST HI 40 MIN: CPT | Performed by: INTERNAL MEDICINE

## 2024-01-19 NOTE — PROGRESS NOTES
RHEUMATOLOGY CLINIC- FOLLOW UP    Lluvia Butt is a 53 year old female.    ASSESSMENT/PLAN:       ICD-10-CM    1. HLA-B27 spondyloarthropathy  M47.899 C-Reactive Protein     Sed Rate, Westergren (Automated)     CBC With Differential With Platelet     Comp Metabolic Panel (14)    +RF      2. Inflammatory polyarthritis (HCC)  M06.4 C-Reactive Protein     Sed Rate, Westergren (Automated)     CBC With Differential With Platelet     Comp Metabolic Panel (14)      3. Recurrent iritis of both eyes  H20.023           DISCUSSION: Patient presents for follow-up after initially presenting as a new patient for recurrent bilateral iritis, ongoing for about a decade, so far treated with topical eyedrops per ophthalmology.  Subsequent workup notable for positive HLA-B27 (as well as positive RF) with degenerative pattern changes on SI joint x-rays in the setting of strong family history of [id number] uveitis/iritis with siblings on Enbrel or Humira.  At this time, patient denies acute visual abnormalities and does not have significant joint pains.  Discussed versus benefits of treatment and patient is wary to start chronic therapy for now given concerns of side effects.  I did discuss potential for progressive inflammation and risk associated with this.  However, I do think it would be reasonable to monitor for now and start a DMARD/immunologic in the future should symptoms become more recurrent.  Specifically, patient information given regarding methotrexate.  As the patient does not appear to have significant axial inflammatory arthritis, could consider oral DMARDs over TNF inhibitor treatment first.    PLAN:  --Discussed side effects of MTX including possible alopecia, hepatotoxicity, mucositis, GI upset, as well as immunosuppressive effects.  Also, emphasized importance of avoiding EtOH while on MTX.  Explained that MTX may take several weeks to months for noticeable symptom improvement.  -Discussed with patient, given  possible immunosuppressive effects of both potential treatment and her underlying inflammatory condition, the importance of keeping vaccinations and age-appropriate screenings up-to-date.        Patient to follow-up sooner should symptoms recur for initiation of treatment.  Otherwise, patient may monitor symptoms and follow-up in 6 months with repeat labs drawn prior.    Carolyn Love DO  2024   12:43 PM      Discussed with patient that they have an acute on chronic inflammatory illness that has the potential to pose a threat to life or bodily function that necessitates close monitoring.  Additionally, discussed with patient given the possible immunosuppressive effects of their underlying hyper-inflammatory state, the importance of keeping vaccinations and age-appropriate screenings up-to-date, given increased risk of complications and malignancies seen in these patient populations.  Patient to f/u with repeat labs drawn prior.    Last QuantiFERON TB testin23  Last Hepatitis testin23        HPI:     24 Follow-Up:     In the interim since the patient's last appointment, received a fax regarding patient's last ophthalmology appointment with [facility], Dr. Karon Crocker, on 10/26/2023.  During that time, patient presenting for follow-up of iritis with patient's vision improving and inflammation improving but not resolved.  Advised further workup with rheumatology.    During the current appointment, no further flares of her visual symptoms since our last appointment.  Otherwise, no acute symptoms or concerns.  Presenting to discuss workup.    Current Medications:   As needed eyedrops per ophthalmology    Medication History:   N/A    Interval History:     23 Initial Consult:     I had the pleasure of seeing Lluvia Butt on 2023 as a new outpatient consultation for history of uveitis. The patient was originally referred by her ophthalmologist.    53 year old female w/ PMH  recurrent bilateral uveitis who presents to clinic w/ episodes of recurrent bilateral uveitis.  She states that episodes are characterized by periorbital headaches, eye redness, photophobia, and eye pain especially with extraocular movements.  She is had a decade of recurrent episodes with symptoms previously occurring once a year but this most recent year, she has had multiple episodes.  Symptoms are improved by topical steroid eyedrops.  No known triggers to her symptoms including no known preceding illness, no GI symptoms and no history of ulcerative colitis or Crohn's disease.  She denies joint pain including no chronic back pain and no swollen joints.  ROS also negative for fever, chills, rash including no psoriatic plaques, no respiratory symptoms, no swelling of her nose or ears, no neuropathies, no Raynaud's phenomenon, no recurrent epistaxis, no oral/nasal ulcers, no jaw claudication.  She does admit to chronically dry eyes.  Family history is notable for multiple family members also with uveitis: Sister with recurrent uveitis, brother with ankylosing spondylitis complicated by uveitis, and father with ankylosing spondylitis complicated by uveitis.      HISTORY:  Past Medical History:   Diagnosis Date    Gestational diabetes     Del Cid metatarsalgia, bilateral 2019    [place] product of IVF pregnancy     Screen for colon cancer     repeat CLN in 10 years    Uveitis of both eyes       Social Hx Reviewed   Family Hx Reviewed     Medications (Active prior to today's visit):  No current outpatient medications on file.       Allergies:  Allergies   Allergen Reactions    Penicillins HIVES    Iodine (Topical) OTHER (SEE COMMENTS)     Pt is unsure of reaction.         ROS:   Review of Systems   Constitutional:  Negative for chills and fever.   HENT:  Negative for congestion, hearing loss, mouth sores, nosebleeds and trouble swallowing.    Eyes:  Positive for photophobia (h/o uveitis), pain (h/o uveitis),  redness (.h/o uveitis) and visual disturbance (h/o uveitis).        No current visual sx but h/o   Respiratory:  Negative for cough and shortness of breath.    Cardiovascular:  Negative for chest pain, palpitations and leg swelling.   Gastrointestinal:  Negative for abdominal pain, blood in stool, diarrhea and nausea.   Endocrine: Negative for cold intolerance and heat intolerance.   Genitourinary:  Negative for dysuria, frequency and hematuria.   Musculoskeletal:  Negative for arthralgias, back pain, gait problem, joint swelling, myalgias, neck pain and neck stiffness.   Skin:  Negative for color change and rash.   Neurological:  Negative for dizziness, weakness, numbness and headaches.   Psychiatric/Behavioral:  Negative for confusion and dysphoric mood.         PHYSICAL EXAM:     Constitutional:  Well developed, Well nourished, No acute distress  HENT:  Normocephalic, Atraumatic, Bilateral external ears normal, Oropharynx moist, No oral exudates.  Neck: Normal range of motion, No tenderness, Supple, No stridor.  Eyes:  PERRL, EOMI, Conjunctiva normal, No discharge.  Respiratory:  Normal breath sounds, No respiratory distress, No wheezing.  Cardiovascular:  Normal heart rate, Normal rhythm, No murmurs, No rubs, No gallops.  GI:  Bowel sounds normal, Soft, No tenderness, No masses, No pulsatile masses.  : No CVA tenderness.  Musculoskeletal:  Good range of motion in all major joints including negative bilateral CARTER testing, normal bilateral logroll. A comprehensive 28 count joint exam was done and was negative for swelling or tenderness except as noted. Inspections for misalignment, asymmetry, crepitation, defects, tenderness, masses, nodules, effusions, range of motion, and stability in the upper and lower extremities bilaterally are all normal unless noted.           Integument:  Warm, Dry, No erythema, No rash.  Lymphatic:  No lymphadenopathy noted.  Neurologic:  Alert & oriented x 3, Normal motor function,  Normal sensory function, No focal deficits noted.  Psychiatric:  Affect normal, Judgment normal, Mood normal.    LABS:     23  ACE 55 WNL  Lysozyme 4.5 WNL    MELY negative, dsDNA less than 0.6  C3 137.7 WNL, C4 39.7 (high)  RF 29 (high)  CCP 1.1 WNL  HLA-B27 positive  Vasculitis panel negative    ESR 27 WNL  CRP 0.7 WNL    CBC with normocytic anemia Hg 11.9, normal WBC and platelet  CMP with normal renal and hepatic function    TSH 3.302 WNL, free T41.1 WNL    Acute hepatitis panel nonreactive  TB testing negative    2021:  CBC with normocytic anemia Hg 11.6 but no other cytopenias, CMP with normal renal hepatic function    2019:  MELY negative  TSH 3.210 WNL    Imagin/28/23 SI Joint XR:     Impression   CONCLUSION:  1. Mild degenerative-pattern sclerosis involving the diarthrodial portions of the sacroiliac joints bilaterally.     2. No significant ankylosis perceived.     3. Negative for radiographically manifested erosive arthropathy.       23 Lumbar Spine XR:    Impression   CONCLUSION:  1. No significant ankylosis or enthesopathy is demonstrated.     2. No radiographically visible acute osseous injury of the lumbar spine.       2023 x-ray independently reviewed and notable for: No ankylosing nor inflammatory changes noted along the thoracic spine.No perihilar lymphadenopathy.

## 2024-07-10 ENCOUNTER — OFFICE VISIT (OUTPATIENT)
Dept: RHEUMATOLOGY | Facility: CLINIC | Age: 54
End: 2024-07-10
Payer: COMMERCIAL

## 2024-07-10 VITALS
BODY MASS INDEX: 25.46 KG/M2 | SYSTOLIC BLOOD PRESSURE: 113 MMHG | HEIGHT: 64.5 IN | HEART RATE: 61 BPM | DIASTOLIC BLOOD PRESSURE: 73 MMHG | WEIGHT: 151 LBS

## 2024-07-10 DIAGNOSIS — H20.9 UVEITIS: ICD-10-CM

## 2024-07-10 DIAGNOSIS — H20.023 RECURRENT IRITIS OF BOTH EYES: ICD-10-CM

## 2024-07-10 DIAGNOSIS — M47.899 HLA-B27 SPONDYLOARTHROPATHY: Primary | ICD-10-CM

## 2024-07-10 DIAGNOSIS — M06.4 INFLAMMATORY POLYARTHRITIS (HCC): ICD-10-CM

## 2024-07-10 PROCEDURE — 99214 OFFICE O/P EST MOD 30 MIN: CPT | Performed by: INTERNAL MEDICINE

## 2024-07-10 NOTE — PROGRESS NOTES
RHEUMATOLOGY CLINIC- FOLLOW UP    Lluvia Butt is a 53 year old female.    ASSESSMENT/PLAN:       ICD-10-CM    1. HLA-B27 spondyloarthropathy  M47.899     +RF      2. Inflammatory polyarthritis (HCC)  M06.4       3. Recurrent iritis of both eyes  H20.023       4. Uveitis  H20.9         DISCUSSION: Patient presents for follow-up of recurrent bilateral iritis, ongoing for about a decade, so far treated with topical eyedrops per ophthalmology.  Past workup notable for positive HLA-B27 (as well as positive RF) with degenerative pattern changes on SI joint x-rays in the setting of strong family history of [id number] uveitis/iritis with siblings on Enbrel or Humira.  At this time, patient denies acute visual abnormalities and does not have significant joint pains. I do think it would be reasonable to monitor for now and start a DMARD/immunologic in the future should symptoms become more recurrent.  We discussed with/benefits of methotrexate should this occur in the future.    PLAN:  --Discussed side effects of MTX including possible alopecia, hepatotoxicity, mucositis, GI upset, as well as immunosuppressive effects.  Also, emphasized importance of avoiding EtOH while on MTX.  Explained that MTX may take several weeks to months for noticeable symptom improvement.  -Discussed with patient, given possible immunosuppressive effects of both potential treatment and her underlying inflammatory condition, the importance of keeping vaccinations and age-appropriate screenings up-to-date.        Patient to contact her ophthalmologist in our office should her symptoms flare again or recur.  Can consider starting methotrexate at that time.  Otherwise, can continue follow-up once yearly.      Carolyn Love DO  7/10/2024   3:53 PM  There is a longitudinal care relationship with me, the care plan reflects the ongoing nature of the continuous relationship of care, and the medical record indicates that there is ongoing treatment of  a serious/complex medical condition which I am currently managing.  is Applicable.     Discussed with patient that they have an acute on chronic inflammatory illness that has the potential to pose a threat to life or bodily function that necessitates close monitoring.  Additionally, discussed with patient given the possible immunosuppressive effects of their underlying hyper-inflammatory state, the importance of keeping vaccinations and age-appropriate screenings up-to-date, given increased risk of complications and malignancies seen in these patient populations.  Patient to f/u with repeat labs drawn prior.  Last QuantiFERON TB testin23  Last Hepatitis testin23        SUBJECTIVE:     7/10/24 Follow-Up: Patient denies significant flares since her last appt. Has suspected allergies w/ sneezing. Uses steroid eye drops in these cases. No eye pain.     Current Medications:   As needed eyedrops per ophthalmology    Medication History:   N/A    Interval History:     23 Initial Consult:     I had the pleasure of seeing Lluvia Butt on 2023 as a new outpatient consultation for history of uveitis. The patient was originally referred by her ophthalmologist.    53 year old female w/ PMH recurrent bilateral uveitis who presents to clinic w/ episodes of recurrent bilateral uveitis.  She states that episodes are characterized by periorbital headaches, eye redness, photophobia, and eye pain especially with extraocular movements.  She is had a decade of recurrent episodes with symptoms previously occurring once a year but this most recent year, she has had multiple episodes.  Symptoms are improved by topical steroid eyedrops.  No known triggers to her symptoms including no known preceding illness, no GI symptoms and no history of ulcerative colitis or Crohn's disease.  She denies joint pain including no chronic back pain and no swollen joints.  ROS also negative for fever, chills, rash including  no psoriatic plaques, no respiratory symptoms, no swelling of her nose or ears, no neuropathies, no Raynaud's phenomenon, no recurrent epistaxis, no oral/nasal ulcers, no jaw claudication.  She does admit to chronically dry eyes.  Family history is notable for multiple family members also with uveitis: Sister with recurrent uveitis, brother with ankylosing spondylitis complicated by uveitis, and father with ankylosing spondylitis complicated by uveitis.    24 Follow-Up:     In the interim since the patient's last appointment, received a fax regarding patient's last ophthalmology appointment with [facility], Dr. Karon Crocker, on 10/26/2023.  During that time, patient presenting for follow-up of iritis with patient's vision improving and inflammation improving but not resolved.  Advised further workup with rheumatology.    During the current appointment, no further flares of her visual symptoms since our last appointment.  Otherwise, no acute symptoms or concerns.  Presenting to discuss workup.    HISTORY:  Past Medical History:    Gestational diabetes (HCC)    Del Cid metatarsalgia, bilateral     product of IVF pregnancy (HCC)    Screen for colon cancer    repeat CLN in 10 years    Uveitis of both eyes      Social Hx Reviewed   Family Hx Reviewed     Medications (Active prior to today's visit):  No current outpatient medications on file.       Allergies:  Allergies   Allergen Reactions    Penicillins HIVES    Iodine (Topical) OTHER (SEE COMMENTS)     Pt is unsure of reaction.         ROS:   Review of Systems   Constitutional:  Negative for chills and fever.   HENT:  Negative for congestion, hearing loss, mouth sores, nosebleeds and trouble swallowing.    Eyes:  Positive for photophobia (h/o uveitis), pain (h/o uveitis), redness (.h/o uveitis) and visual disturbance (h/o uveitis).        No current visual sx but h/o   Respiratory:  Negative for cough and shortness of breath.    Cardiovascular:   Negative for chest pain, palpitations and leg swelling.   Gastrointestinal:  Negative for abdominal pain, blood in stool, diarrhea and nausea.   Endocrine: Negative for cold intolerance and heat intolerance.   Genitourinary:  Negative for dysuria, frequency and hematuria.   Musculoskeletal:  Negative for arthralgias, back pain, gait problem, joint swelling, myalgias, neck pain and neck stiffness.   Skin:  Negative for color change and rash.   Neurological:  Negative for dizziness, weakness, numbness and headaches.   Psychiatric/Behavioral:  Negative for confusion and dysphoric mood.         PHYSICAL EXAM:     Constitutional:  Well developed, Well nourished, No acute distress  HENT:  Normocephalic, Atraumatic, Bilateral external ears normal, Oropharynx moist, No oral exudates.  Neck: Normal range of motion, No tenderness, Supple, No stridor.  Eyes:  PERRL, EOMI, Conjunctiva normal, No discharge.  Respiratory:  Normal breath sounds, No respiratory distress, No wheezing.  Cardiovascular:  Normal heart rate, Normal rhythm, No murmurs, No rubs, No gallops.  GI:  Bowel sounds normal, Soft, No tenderness, No masses, No pulsatile masses.  : No CVA tenderness.  Musculoskeletal:  Good range of motion in all major joints including negative bilateral CARTER testing, normal bilateral logroll. A comprehensive 28 count joint exam was done and was negative for swelling or tenderness except as noted. Inspections for misalignment, asymmetry, crepitation, defects, tenderness, masses, nodules, effusions, range of motion, and stability in the upper and lower extremities bilaterally are all normal unless noted.           Integument:  Warm, Dry, No erythema, No rash.  Lymphatic:  No lymphadenopathy noted.  Neurologic:  Alert & oriented x 3, Normal motor function, Normal sensory function, No focal deficits noted.  Psychiatric:  Affect normal, Judgment normal, Mood normal.    LABS:     11/28/23  ACE 55 WNL  Lysozyme 4.5 WNL    MELY  negative, dsDNA less than 0.6  C3 137.7 WNL, C4 39.7 (high)  RF 29 (high)  CCP 1.1 WNL  HLA-B27 positive  Vasculitis panel negative    ESR 27 WNL  CRP 0.7 WNL    CBC with normocytic anemia Hg 11.9, normal WBC and platelet  CMP with normal renal and hepatic function    TSH 3.302 WNL, free T41.1 WNL    Acute hepatitis panel nonreactive  TB testing negative    2021:  CBC with normocytic anemia Hg 11.6 but no other cytopenias, CMP with normal renal hepatic function    2019:  MELY negative  TSH 3.210 WNL    Imagin/28/23 SI Joint XR:     Impression   CONCLUSION:  1. Mild degenerative-pattern sclerosis involving the diarthrodial portions of the sacroiliac joints bilaterally.     2. No significant ankylosis perceived.     3. Negative for radiographically manifested erosive arthropathy.       23 Lumbar Spine XR:    Impression   CONCLUSION:  1. No significant ankylosis or enthesopathy is demonstrated.     2. No radiographically visible acute osseous injury of the lumbar spine.       2023 x-ray independently reviewed and notable for: No ankylosing nor inflammatory changes noted along the thoracic spine.No perihilar lymphadenopathy.

## 2024-10-24 ENCOUNTER — TELEPHONE (OUTPATIENT)
Dept: ORTHOPEDICS CLINIC | Facility: CLINIC | Age: 54
End: 2024-10-24

## 2024-10-24 DIAGNOSIS — M79.672 BILATERAL FOOT PAIN: Primary | ICD-10-CM

## 2024-10-24 DIAGNOSIS — M79.671 BILATERAL FOOT PAIN: Primary | ICD-10-CM

## 2024-10-24 NOTE — TELEPHONE ENCOUNTER
New patient scheduled an appt on 11/21 for bilateral foot pain. Please enter xray orders. Advised patient to arrive at 1:00 for x-rays on-site.

## 2024-11-21 ENCOUNTER — HOSPITAL ENCOUNTER (OUTPATIENT)
Dept: GENERAL RADIOLOGY | Age: 54
Discharge: HOME | End: 2024-11-21
Attending: PODIATRIST
Payer: COMMERCIAL

## 2024-11-21 ENCOUNTER — OFFICE VISIT (OUTPATIENT)
Dept: ORTHOPEDICS CLINIC | Facility: CLINIC | Age: 54
End: 2024-11-21

## 2024-11-21 VITALS — HEIGHT: 64.5 IN | BODY MASS INDEX: 25.46 KG/M2 | WEIGHT: 151 LBS

## 2024-11-21 DIAGNOSIS — M79.672 BILATERAL FOOT PAIN: ICD-10-CM

## 2024-11-21 DIAGNOSIS — M79.671 BILATERAL FOOT PAIN: ICD-10-CM

## 2024-11-21 DIAGNOSIS — M77.42 METATARSALGIA OF BOTH FEET: ICD-10-CM

## 2024-11-21 DIAGNOSIS — M21.619 BUNION: Primary | ICD-10-CM

## 2024-11-21 DIAGNOSIS — M77.9 TENDONITIS: ICD-10-CM

## 2024-11-21 DIAGNOSIS — M24.9 HYPERMOBILE JOINTS: ICD-10-CM

## 2024-11-21 DIAGNOSIS — M77.41 METATARSALGIA OF BOTH FEET: ICD-10-CM

## 2024-11-21 DIAGNOSIS — L90.9 FAT PAD ATROPHY OF FOOT: ICD-10-CM

## 2024-11-21 PROCEDURE — 73630 X-RAY EXAM OF FOOT: CPT | Performed by: PODIATRIST

## 2024-11-21 NOTE — PROGRESS NOTES
[facility] New Patient Note    CC:   Chief Complaint   Patient presents with    Foot Pain     Bilateral foot issues;  Right foot pain;  ONSET: 2024  Pain Score: 6-7 comes and goes       HPI: The patient is a 53 year old female who presents today with complaints of discomfort involving both feet but more so the right.    Wore sneakers in covid and never seem to be able to get out of them and do other shoes again    Had orthotics for maybe bone spur  About 8yrs old    Several months ago she went for a hike in flat sneakers  Pads of met area really hurt and she is needed to stretch her toes.  Ice helps.  Gets shooting pain  Feels swollen.    History of both feet bunion but no pain      Past Medical History:    Gestational diabetes (HCC)    Del Cid metatarsalgia, bilateral    [place] product of IVF pregnancy (HCC)    Screen for colon cancer    repeat CLN in 10 years    Uveitis of both eyes     Past Surgical History:   Procedure Laterality Date    Colposcopy, vulva            x3     No current outpatient medications on file.     Allergies[1]  Family History   Problem Relation Age of Onset    Other ([place]) Father         [place]    Other (Other) Sister         Ankylosing spondylitis    Other (Other) Brother         Ankylosing spondylitis     Social History     Occupational History    Not on file   Tobacco Use    Smoking status: Never    Smokeless tobacco: Never   Vaping Use    Vaping status: Never Used   Substance and Sexual Activity    Alcohol use: Yes     Comment: occ    Drug use: No    Sexual activity: Not on file        ROS:  Complete ROS reviewed by me and non-contributory to the chief complaint except as mentioned above.    Physical Exam:    Ht 5' 4.5\" (1.638 m)   Wt 151 lb (68.5 kg)   LMP  (LMP Unknown)   BMI 25.52 kg/m²       Bunion with bursitis will first MP joint.  Hypermobility of the first ray.  Toes are rectus otherwise.  Increased hallux valgus and hallux abductus.  No  corns no calluses.  Full range of motion first MP joint without pain  No swelling  Mild interdigital pain at the second interspace right foot  Diminished fat pad across the metatarsals and shifting into the distal sulcus    Sensation is intact sharp versus dull.  She can feel light touch to the tips of the toes  Palpable pedal pulses.  Hair growth is present.  Skin is supple and feet are well perfused and warm.    Strength is 5 out of 5 all muscle groups    Full range of motion and nonpainful motion of the ankle joint, subtalar joint, MP joints, and midfoot joints.     Imaging:  xrays WB bilateral  Neutral arch on lateral view  Increased IM angle with hallux abductus and significant bunion       personally viewed, independently interpreted and radiology report read.      Assessment/Diagnoses:  Diagnoses and all orders for this visit:    Bunion    Metatarsalgia of both feet  -     DME - EXTERNAL   -     Physical Therapy Referral - [place] Locations    Fat pad atrophy of foot  -     DME - EXTERNAL   -     Physical Therapy Referral - [place] Locations    Tendonitis  -     Physical Therapy Referral - [place] Locations    Hypermobile joints        Plan:  I reviewed imaging and exam findings with the patient.    She has fat pad atrophy and shifting of the fat pad  This affects met pad pain and mpj's  She also has a pretty significant bunion bilateral with bursitis but her hypermobility has contributed to less pain.  Likely has some flexor tendinitis to the toes and yoga toes and physical therapy may help    PT  Rx given    New orthotics Rx Rinella  Gradual increase in use      Discussed shoe types    Can consider steroid injection prn - she has neuroma type pain vs bursitis            Toma Rose, [facility]  FACFAS  [facility]  [address], [address] [address]   [address] [place] [address]  [email or web address]  [email or web address]  t: [phone]   f:  [phone]              This document was partially prepared using Dragon Medical voice recognition software.            [1]   Allergies  Allergen Reactions    Penicillins HIVES    Iodine (Topical) OTHER (SEE COMMENTS)     Pt is unsure of reaction.